# Patient Record
Sex: MALE | Race: WHITE | NOT HISPANIC OR LATINO | Employment: FULL TIME | URBAN - METROPOLITAN AREA
[De-identification: names, ages, dates, MRNs, and addresses within clinical notes are randomized per-mention and may not be internally consistent; named-entity substitution may affect disease eponyms.]

---

## 2023-11-17 ENCOUNTER — OFFICE VISIT (OUTPATIENT)
Dept: FAMILY MEDICINE CLINIC | Facility: CLINIC | Age: 23
End: 2023-11-17
Payer: COMMERCIAL

## 2023-11-17 VITALS
WEIGHT: 266 LBS | OXYGEN SATURATION: 98 % | HEIGHT: 72 IN | DIASTOLIC BLOOD PRESSURE: 62 MMHG | SYSTOLIC BLOOD PRESSURE: 120 MMHG | TEMPERATURE: 97.7 F | HEART RATE: 72 BPM | BODY MASS INDEX: 36.03 KG/M2

## 2023-11-17 DIAGNOSIS — N45.1 ACUTE EPIDIDYMITIS: Primary | ICD-10-CM

## 2023-11-17 PROCEDURE — 99204 OFFICE O/P NEW MOD 45 MIN: CPT | Performed by: FAMILY MEDICINE

## 2023-11-17 RX ORDER — CIPROFLOXACIN 500 MG/1
500 TABLET, FILM COATED ORAL 2 TIMES DAILY
Qty: 14 TABLET | Refills: 0 | Status: SHIPPED | OUTPATIENT
Start: 2023-11-17 | End: 2023-11-24

## 2023-11-17 NOTE — PROGRESS NOTES
"Subjective   Herman Cope is a 22 y.o. male.   Chief Complaint   Patient presents with    Testicle Pain       History of Present Illness   Healthy 22-year-old white male without significant past medical history presents to the office today as a new patient.    He has a complaint of soreness of the left testicle which onset summer of this year.  Lasted for a month or so.  Went away and came back again a few weeks ago.  Only on the left side.  No change in urine stream.  No blood.  No pain with urination.  No suspicion of STD exposure.  No blood in ejaculate.  No pain with ejaculation.  He does feel a small lump or cyst on the left side.      There are no problems to display for this patient.          Past Surgical History:   Procedure Laterality Date    UMBILICAL EXPLORATION      as middle school child     No current outpatient medications on file prior to visit.     No current facility-administered medications on file prior to visit.     No Known Allergies  Social History     Socioeconomic History    Marital status: Single   Tobacco Use    Smoking status: Never   Substance and Sexual Activity    Alcohol use: Defer    Drug use: Defer    Sexual activity: Defer     Family History   Problem Relation Age of Onset    Diabetes Mother     Hypertension Mother        Review of Systems    Objective   /62 (BP Location: Right arm, Patient Position: Sitting, Cuff Size: Adult)   Pulse 72   Temp 97.7 °F (36.5 °C) (Infrared)   Ht 182.9 cm (72\")   Wt 121 kg (266 lb)   SpO2 98%   BMI 36.08 kg/m²   Physical Exam  Constitutional:       Appearance: He is well-developed.      Comments:      HENT:      Head: Normocephalic and atraumatic.   Eyes:      Conjunctiva/sclera: Conjunctivae normal.   Cardiovascular:      Rate and Rhythm: Normal rate.   Pulmonary:      Effort: Pulmonary effort is normal.   Genitourinary:     Comments: He has tenderness to palpation to the epididymis on the left side.  No hard masses felt in either " testicle.    Musculoskeletal:         General: Normal range of motion.      Cervical back: Normal range of motion.   Skin:     General: Skin is warm and dry.      Findings: No rash.   Neurological:      Mental Status: He is alert and oriented to person, place, and time.   Psychiatric:         Behavior: Behavior normal.         Assessment & Plan   Diagnoses and all orders for this visit:    1. Acute epididymitis (Primary)  -     ciprofloxacin (Cipro) 500 MG tablet; Take 1 tablet by mouth 2 (Two) Times a Day for 7 days.  Dispense: 14 tablet; Refill: 0    He appears to have acute epididymitis.  Explained to him that this is a problem that can come and go.  I think we should go ahead and treat him since he has had 2 episodes in the past several months.  I am sending a prescription for Cipro to the pharmacy.  1 pill twice daily for a week  If this happens again, treat with elevation, ice pack.  If symptoms persist let me know.  Herman is a new patient with a new problem with an uncertain final prognosis that requires treatment with prescription medications.        Call with any problems or concerns before next visit       Return if symptoms worsen or fail to improve.      Much of this report is an electronic transcription of spoken language to printed text using Dragon dictation software.  As such, the subtleties and finesse of spoken language may permit erroneous, or at times, nonsensical words or phrases to be inadvertently transcribed; thus changes may be made at a later date to rectify these errors.     Angy Arrington MD11/17/202310:34 EST  This note has been electronically signed